# Patient Record
Sex: MALE | Race: WHITE | NOT HISPANIC OR LATINO | Employment: OTHER | URBAN - METROPOLITAN AREA
[De-identification: names, ages, dates, MRNs, and addresses within clinical notes are randomized per-mention and may not be internally consistent; named-entity substitution may affect disease eponyms.]

---

## 2017-10-26 ENCOUNTER — TELEPHONE (OUTPATIENT)
Dept: GASTROENTEROLOGY | Facility: CLINIC | Age: 57
End: 2017-10-26

## 2017-10-26 NOTE — TELEPHONE ENCOUNTER
----- Message from Lynn Cifuentes sent at 10/26/2017 12:20 PM CDT -----  Contact: PT  PT is wanting to be seen by anyone who specializes in stomach problems.  PT stated that he does have lung cancer, but he's not too worried about that as much as his stomach. He stated he's starving and can't keep anything down.     [facility] [employer].     Callback: [phone]

## 2017-10-27 ENCOUNTER — TELEPHONE (OUTPATIENT)
Dept: HEMATOLOGY/ONCOLOGY | Facility: CLINIC | Age: 57
End: 2017-10-27

## 2017-10-27 NOTE — TELEPHONE ENCOUNTER
----- Message from Lynn Cifuentes sent at 10/26/2017 12:20 PM CDT -----  Contact: PT  PT is wanting to be seen by anyone who specializes in stomach problems.  PT stated that he does have lung cancer, but he's not too worried about that as much as his stomach. He stated he's starving and can't keep anything down.     PT usually goes to [employer].     Callback: [phone]

## 2018-01-03 ENCOUNTER — TELEPHONE (OUTPATIENT)
Dept: GASTROENTEROLOGY | Facility: CLINIC | Age: 58
End: 2018-01-03

## 2018-01-03 NOTE — TELEPHONE ENCOUNTER
----- Message from Mayra Conte sent at 1/3/2018 12:39 PM CST -----  Contact: Pt:[phone]   Pt called and states he would like to speak with the nurse to reschedule an appointment.